# Patient Record
Sex: FEMALE | Race: WHITE
[De-identification: names, ages, dates, MRNs, and addresses within clinical notes are randomized per-mention and may not be internally consistent; named-entity substitution may affect disease eponyms.]

---

## 2020-08-20 ENCOUNTER — HOSPITAL ENCOUNTER (OUTPATIENT)
Dept: HOSPITAL 53 - M SFHCWAGY | Age: 30
End: 2020-08-20
Attending: NURSE PRACTITIONER
Payer: COMMERCIAL

## 2020-08-20 DIAGNOSIS — Z12.4: Primary | ICD-10-CM

## 2020-08-20 PROCEDURE — 87624 HPV HI-RISK TYP POOLED RSLT: CPT

## 2020-08-20 PROCEDURE — 87661 TRICHOMONAS VAGINALIS AMPLIF: CPT

## 2020-08-28 LAB
CHLAMYDIA DNA AMPLIFICATION: NEGATIVE
N GONORRHOEA RRNA SPEC QL NAA+PROBE: NEGATIVE

## 2020-09-18 ENCOUNTER — HOSPITAL ENCOUNTER (OUTPATIENT)
Dept: HOSPITAL 53 - M PLALAB | Age: 30
End: 2020-09-18
Attending: NURSE PRACTITIONER
Payer: COMMERCIAL

## 2020-09-18 DIAGNOSIS — Z11.4: Primary | ICD-10-CM

## 2020-12-18 ENCOUNTER — HOSPITAL ENCOUNTER (EMERGENCY)
Dept: HOSPITAL 53 - M ED | Age: 30
Discharge: HOME | End: 2020-12-18
Payer: COMMERCIAL

## 2020-12-18 VITALS — HEIGHT: 67 IN | WEIGHT: 149.69 LBS | BODY MASS INDEX: 23.49 KG/M2

## 2020-12-18 VITALS — DIASTOLIC BLOOD PRESSURE: 51 MMHG | SYSTOLIC BLOOD PRESSURE: 102 MMHG
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
## 2020-12-18 DIAGNOSIS — S30.810A: Primary | ICD-10-CM

## 2020-12-18 NOTE — REP
INDICATION:

fell/pain.



COMPARISON:

None.



TECHNIQUE:

Three views.



FINDINGS:

Mineralization is normal.  There is no fracture or dislocation.  The sacroiliac

articulations unremarkable.  The sacral a ala and foramen are unremarkable.



IMPRESSION:

Essentially negative sacrum and coccyx.  No fractures are identified.

Symptoms persist or worsen consider CT or MRI.





<Electronically signed by Mateo Narvaez > 12/18/20 1262

## 2021-04-21 ENCOUNTER — HOSPITAL ENCOUNTER (OUTPATIENT)
Dept: HOSPITAL 53 - M SFHCWAGY | Age: 31
End: 2021-04-21
Attending: NURSE PRACTITIONER
Payer: COMMERCIAL

## 2021-04-21 DIAGNOSIS — Z11.3: Primary | ICD-10-CM

## 2021-04-21 LAB
CHLAMYDIA DNA AMPLIFICATION: NEGATIVE
N GONORRHOEA RRNA SPEC QL NAA+PROBE: NEGATIVE

## 2021-04-21 PROCEDURE — 87591 N.GONORRHOEAE DNA AMP PROB: CPT

## 2021-04-21 PROCEDURE — 87491 CHLMYD TRACH DNA AMP PROBE: CPT

## 2021-04-21 PROCEDURE — 87210 SMEAR WET MOUNT SALINE/INK: CPT

## 2021-06-03 ENCOUNTER — HOSPITAL ENCOUNTER (OUTPATIENT)
Dept: HOSPITAL 53 - M WUC | Age: 31
End: 2021-06-03
Attending: PHYSICIAN ASSISTANT
Payer: COMMERCIAL

## 2021-06-03 DIAGNOSIS — R06.02: Primary | ICD-10-CM

## 2021-06-07 ENCOUNTER — HOSPITAL ENCOUNTER (OUTPATIENT)
Dept: HOSPITAL 53 - M LAB | Age: 31
End: 2021-06-07
Attending: PHYSICIAN ASSISTANT
Payer: COMMERCIAL

## 2021-06-07 DIAGNOSIS — Z01.84: Primary | ICD-10-CM

## 2021-08-16 ENCOUNTER — HOSPITAL ENCOUNTER (OUTPATIENT)
Dept: HOSPITAL 53 - M WHC | Age: 31
End: 2021-08-16
Attending: OBSTETRICS & GYNECOLOGY
Payer: COMMERCIAL

## 2021-08-16 DIAGNOSIS — Z98.82: Primary | ICD-10-CM

## 2022-10-22 ENCOUNTER — HOSPITAL ENCOUNTER (EMERGENCY)
Dept: HOSPITAL 53 - M ED | Age: 32
Discharge: HOME | End: 2022-10-22
Payer: COMMERCIAL

## 2022-10-22 VITALS — BODY MASS INDEX: 26.85 KG/M2 | HEIGHT: 67 IN | WEIGHT: 171.08 LBS

## 2022-10-22 VITALS — SYSTOLIC BLOOD PRESSURE: 122 MMHG | DIASTOLIC BLOOD PRESSURE: 65 MMHG
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
## 2022-10-22 DIAGNOSIS — Z91.018: ICD-10-CM
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
## 2022-10-22 DIAGNOSIS — S61.442A: Primary | ICD-10-CM
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
## 2022-10-22 DIAGNOSIS — F90.9: ICD-10-CM
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
## 2022-10-22 DIAGNOSIS — F10.10: ICD-10-CM
